# Patient Record
Sex: MALE | Race: WHITE | NOT HISPANIC OR LATINO | Employment: FULL TIME | URBAN - METROPOLITAN AREA
[De-identification: names, ages, dates, MRNs, and addresses within clinical notes are randomized per-mention and may not be internally consistent; named-entity substitution may affect disease eponyms.]

---

## 2023-03-14 LAB
FLU A RESULT: NOT DETECTED
FLU B RESULT: NOT DETECTED
GROUP A STREP, PCR: NOT DETECTED
SARS-COV-2 RESULT: DETECTED

## 2025-05-22 ENCOUNTER — HOSPITAL ENCOUNTER (EMERGENCY)
Facility: HOSPITAL | Age: 60
Discharge: HOME | End: 2025-05-22
Attending: EMERGENCY MEDICINE
Payer: COMMERCIAL

## 2025-05-22 VITALS
DIASTOLIC BLOOD PRESSURE: 96 MMHG | HEIGHT: 69 IN | WEIGHT: 205 LBS | BODY MASS INDEX: 30.36 KG/M2 | TEMPERATURE: 97.9 F | HEART RATE: 78 BPM | OXYGEN SATURATION: 98 % | RESPIRATION RATE: 18 BRPM | SYSTOLIC BLOOD PRESSURE: 152 MMHG

## 2025-05-22 DIAGNOSIS — N48.33 PRIAPISM, DRUG-INDUCED: Primary | ICD-10-CM

## 2025-05-22 PROCEDURE — 99281 EMR DPT VST MAYX REQ PHY/QHP: CPT | Mod: 25 | Performed by: EMERGENCY MEDICINE

## 2025-05-22 PROCEDURE — 99283 EMERGENCY DEPT VISIT LOW MDM: CPT | Mod: 25

## 2025-05-22 PROCEDURE — 54220 IRRG CRPRA CAVRNOSA PRIAPISM: CPT | Performed by: EMERGENCY MEDICINE

## 2025-05-22 PROCEDURE — 2500000004 HC RX 250 GENERAL PHARMACY W/ HCPCS (ALT 636 FOR OP/ED): Mod: JZ

## 2025-05-22 RX ORDER — PHENYLEPHRINE HCL IN 0.9% NACL 1 MG/10 ML
SYRINGE (ML) INTRAVENOUS
Status: COMPLETED
Start: 2025-05-22 | End: 2025-05-22

## 2025-05-22 RX ORDER — PHENYLEPHRINE HCL IN 0.9% NACL 1 MG/10 ML
100 SYRINGE (ML) INTRAVENOUS EVERY 10 MIN PRN
Status: DISCONTINUED | OUTPATIENT
Start: 2025-05-22 | End: 2025-05-23 | Stop reason: HOSPADM

## 2025-05-22 RX ADMIN — Medication 100 MCG: at 21:16

## 2025-05-22 ASSESSMENT — COLUMBIA-SUICIDE SEVERITY RATING SCALE - C-SSRS
2. HAVE YOU ACTUALLY HAD ANY THOUGHTS OF KILLING YOURSELF?: NO
1. IN THE PAST MONTH, HAVE YOU WISHED YOU WERE DEAD OR WISHED YOU COULD GO TO SLEEP AND NOT WAKE UP?: NO
6. HAVE YOU EVER DONE ANYTHING, STARTED TO DO ANYTHING, OR PREPARED TO DO ANYTHING TO END YOUR LIFE?: NO

## 2025-05-22 ASSESSMENT — PAIN SCALES - GENERAL: PAINLEVEL_OUTOF10: 7

## 2025-05-22 ASSESSMENT — PAIN - FUNCTIONAL ASSESSMENT: PAIN_FUNCTIONAL_ASSESSMENT: 0-10

## 2025-05-22 NOTE — ED TRIAGE NOTES
TRIAGE NOTE   I saw the patient as the Clinician in Triage and performed a brief history and physical exam, established acuity, and ordered appropriate tests to develop basic plan of care. Patient will be seen by an ANNELIESE, resident and/or physician who will independently evaluate the patient. Please see subsequent provider notes for further details and disposition.     Brief HPI: In brief, Jose Talamantes is a 59 y.o. male that presents for priapism.  States is the first time he got an injection of Trimix.  Instructions if it should last too long what to do.  He did take 3 Sudafed 30 mg each he states.  Instructed to take 230 mg tablets in the waiting hour take another 2.  He states he took 3 and presents here because is not going down.      Focused PE:  - Constitutional: Alert and oriented x3.  In no acute distress, well-nourished and hydrated.  Cooperative.  - Skin: Pink, warm and dry.    -Neurological: Neurologically intact.    - Musculoskeletal: NANY x4, Normal gait. MSP´s intact.    - Cardiac: Regular rate rhythm.  - Pulmonary: Lungs clear bilaterally. No rales, rhonchi or wheezing.  No stridor or accessory muscle use.  - Abdomen: Abdomen soft and nontender with bowel sounds.  No rebound or guarding.  No CVA tenderness.    Note, physical exam may be limited by patient positioning sitting up in a chair.    Plan/MDM: I examined the patient in triage due to high volumes in the ER.  Labs, testing , and initial imaging based upon reported CC and focused exam      - For the remainder of the patient's workup and ED course, please see the main ED provider note. We discussed need for diagnostic testing including laboratory studies and imaging. We also discussed that they may be asked to wait in the waiting room while these tests are pending. They understand that if they choose to leave without having the testing completed or resulted that we cannot rule out acute life threatening illnesses and the risks involved could lead  to worsening condition, permanent disability or even death

## 2025-05-22 NOTE — ED TRIAGE NOTES
got an erectile dysfunction injection from urology today. At about 3pm,  has had erection since.

## 2025-05-23 NOTE — ED PROVIDER NOTES
Emergency Department Provider Note       History of Present Illness     History provided by: Patient  Limitations to History: None  External Records Reviewed with Brief Summary: None    HPI:  Jose Talamantes is a 59 y.o. male who presents with priapism - pt had urology visit today and was taught how to self-inject with trimix (compounded solution of phentolamine/papaverine/prostaglandin E1).  He gave himself the first injection in the office ~3pm and developed an erection nearly immediately.  He reports continued and now uncomfortable erection for over 4 hours.  As instructed by urology, he took oral sudafed 60mg around 3hr after start of his erection without improvement and came to the ED.    Physical Exam   Triage vitals:  T 36.6 °C (97.9 °F)  HR 90  BP (!) 174/98  RR 18  O2 99 % None (Room air)    General: Awake, alert, in no acute distress  Eyes: Gaze conjugate.  No scleral icterus or injection  HENT: Normo-cephalic, atraumatic. No stridor  CV: Regular rate, regular rhythm  Resp: Breathing non-labored, speaking in full sentences.  Clear to auscultation bilaterally  GI: Soft, non-distended, non-tender. No rebound or guarding.  : performed in presence of chaperone ONEL Delgado - erect penis, well healing site of prior trimix injection, no open wounds  MSK/Extremities: No gross bony deformities. Moving all extremities  Skin: Warm. Appropriate color  Neuro: Alert. Oriented. Face symmetric. Speech is fluent.  Gross strength and sensation intact in b/l UE and LEs  Psych: Appropriate mood and affect      Medical Decision Making & ED Course   Medical Decision Makin y.o. male presents with priapism triggered by trimix urologic injection for ED earlier today.  Pt hypertensive but otherwise stable.  Priapism partially reduced with aspiration of >20cc blood, then fully reduced with injection of 500mcg phenylephrine.  Pt observed in the ED for over an hour after injection with full detumescence and no recurrence.   OK to dc with outpatient urology follow up and return precautions.  ----      Differential diagnoses considered include but are not limited to: priapism    EKG Independent Interpretation: EKG not obtained    Independent Result Review and Interpretation: None obtained    Chronic conditions affecting the patient's care: As documented above in [facility]    The patient was discussed with the following consultants/services: None    Care Considerations: As documented above in [facility]    ED Course:  Diagnoses as of 05/23/25 0009   Priapism, drug-induced       Disposition   As a result of the work-up, the patient was discharged home.  he was informed of his diagnosis and instructed to come back with any concerns or worsening of condition.  he and was agreeable to the plan as discussed above.  he was given the opportunity to ask questions.  All of the patient's questions were answered.    Procedures   Priapism Drainage    Performed by: Elyse H Klerman, MD  Authorized by: Elyse H Klerman, MD    Consent:     Consent obtained:  Verbal    Consent given by:  Patient    Risks, benefits, and alternatives were discussed: yes    Universal protocol:     Patient identity confirmed:  Verbally with patient  Indications:     Indications:  Priapism  Pre-procedure details:     Skin preparation:  Betadine  Procedure details:     Context:  Other (trimix injection for ED)  Length of time present:  Duration:  5 hours  Penile block medication used:   1% lidocaine  Reduction attempted:     Needle gauge:  20 G butterfly    Location:  Right side    Reduction:  Aspiration of corpus cavernosa   Medication:  Phenylephrine   *Please see MAR for detailed timing and dosages of medication administration*    Other:  500mcg of phenylephrine injection after aspiration of 20cc of blood  Post-procedure details:     Procedure completion:  Tolerated well, no immediate complications   Estimated blood loss:  20 mL  Outcome:     Response to procedure:  Medications continued  until the patient was noted to have detumescence, patient to remain in the ED for 1 hour to ensure the erection does not return and instructed ED staff to contact me if erection returns          Elyse H Klerman, MD  Emergency Medicine                                                            Elyse H Klerman, MD  05/23/25 0010